# Patient Record
Sex: MALE | Race: WHITE | NOT HISPANIC OR LATINO | Employment: OTHER | URBAN - METROPOLITAN AREA
[De-identification: names, ages, dates, MRNs, and addresses within clinical notes are randomized per-mention and may not be internally consistent; named-entity substitution may affect disease eponyms.]

---

## 2023-03-08 DIAGNOSIS — N40.0 BENIGN PROSTATIC HYPERPLASIA, UNSPECIFIED WHETHER LOWER URINARY TRACT SYMPTOMS PRESENT: Primary | ICD-10-CM

## 2023-03-08 RX ORDER — TAMSULOSIN HYDROCHLORIDE 0.4 MG/1
0.4 CAPSULE ORAL DAILY
Qty: 90 CAPSULE | Refills: 3 | Status: SHIPPED | OUTPATIENT
Start: 2023-03-08 | End: 2023-06-27 | Stop reason: SDUPTHER

## 2023-06-16 LAB
ALANINE AMINOTRANSFERASE (SGPT) (U/L) IN SER/PLAS: 18 U/L (ref 10–52)
ALBUMIN (G/DL) IN SER/PLAS: 4 G/DL (ref 3.4–5)
ALKALINE PHOSPHATASE (U/L) IN SER/PLAS: 67 U/L (ref 33–136)
ANION GAP IN SER/PLAS: 10 MMOL/L (ref 10–20)
ASPARTATE AMINOTRANSFERASE (SGOT) (U/L) IN SER/PLAS: 21 U/L (ref 9–39)
BASOPHILS (10*3/UL) IN BLOOD BY AUTOMATED COUNT: 0.07 X10E9/L (ref 0–0.1)
BASOPHILS/100 LEUKOCYTES IN BLOOD BY AUTOMATED COUNT: 1.2 % (ref 0–2)
BILIRUBIN TOTAL (MG/DL) IN SER/PLAS: 0.8 MG/DL (ref 0–1.2)
CALCIUM (MG/DL) IN SER/PLAS: 9.3 MG/DL (ref 8.6–10.3)
CARBON DIOXIDE, TOTAL (MMOL/L) IN SER/PLAS: 28 MMOL/L (ref 21–32)
CHLORIDE (MMOL/L) IN SER/PLAS: 106 MMOL/L (ref 98–107)
CREATININE (MG/DL) IN SER/PLAS: 0.98 MG/DL (ref 0.5–1.3)
EOSINOPHILS (10*3/UL) IN BLOOD BY AUTOMATED COUNT: 0.41 X10E9/L (ref 0–0.4)
EOSINOPHILS/100 LEUKOCYTES IN BLOOD BY AUTOMATED COUNT: 7 % (ref 0–6)
ERYTHROCYTE DISTRIBUTION WIDTH (RATIO) BY AUTOMATED COUNT: 13.5 % (ref 11.5–14.5)
ERYTHROCYTE MEAN CORPUSCULAR HEMOGLOBIN CONCENTRATION (G/DL) BY AUTOMATED: 32.3 G/DL (ref 32–36)
ERYTHROCYTE MEAN CORPUSCULAR VOLUME (FL) BY AUTOMATED COUNT: 91 FL (ref 80–100)
ERYTHROCYTES (10*6/UL) IN BLOOD BY AUTOMATED COUNT: 4.95 X10E12/L (ref 4.5–5.9)
GFR MALE: 82 ML/MIN/1.73M2
GLUCOSE (MG/DL) IN SER/PLAS: 89 MG/DL (ref 74–99)
HEMATOCRIT (%) IN BLOOD BY AUTOMATED COUNT: 45.2 % (ref 41–52)
HEMOGLOBIN (G/DL) IN BLOOD: 14.6 G/DL (ref 13.5–17.5)
IMMATURE GRANULOCYTES/100 LEUKOCYTES IN BLOOD BY AUTOMATED COUNT: 0.2 % (ref 0–0.9)
LEUKOCYTES (10*3/UL) IN BLOOD BY AUTOMATED COUNT: 5.8 X10E9/L (ref 4.4–11.3)
LYMPHOCYTES (10*3/UL) IN BLOOD BY AUTOMATED COUNT: 1.67 X10E9/L (ref 0.8–3)
LYMPHOCYTES/100 LEUKOCYTES IN BLOOD BY AUTOMATED COUNT: 28.6 % (ref 13–44)
MONOCYTES (10*3/UL) IN BLOOD BY AUTOMATED COUNT: 0.52 X10E9/L (ref 0.05–0.8)
MONOCYTES/100 LEUKOCYTES IN BLOOD BY AUTOMATED COUNT: 8.9 % (ref 2–10)
NEUTROPHILS (10*3/UL) IN BLOOD BY AUTOMATED COUNT: 3.15 X10E9/L (ref 1.6–5.5)
NEUTROPHILS/100 LEUKOCYTES IN BLOOD BY AUTOMATED COUNT: 54.1 % (ref 40–80)
PLATELETS (10*3/UL) IN BLOOD AUTOMATED COUNT: 246 X10E9/L (ref 150–450)
POTASSIUM (MMOL/L) IN SER/PLAS: 3.8 MMOL/L (ref 3.5–5.3)
PROTEIN TOTAL: 7.2 G/DL (ref 6.4–8.2)
SODIUM (MMOL/L) IN SER/PLAS: 140 MMOL/L (ref 136–145)
UREA NITROGEN (MG/DL) IN SER/PLAS: 17 MG/DL (ref 6–23)

## 2023-06-27 ENCOUNTER — OFFICE VISIT (OUTPATIENT)
Dept: PRIMARY CARE | Facility: CLINIC | Age: 73
End: 2023-06-27
Payer: MEDICARE

## 2023-06-27 VITALS
OXYGEN SATURATION: 95 % | RESPIRATION RATE: 18 BRPM | SYSTOLIC BLOOD PRESSURE: 125 MMHG | HEIGHT: 66 IN | DIASTOLIC BLOOD PRESSURE: 90 MMHG | BODY MASS INDEX: 29.89 KG/M2 | TEMPERATURE: 97 F | WEIGHT: 186 LBS | HEART RATE: 95 BPM

## 2023-06-27 DIAGNOSIS — E66.3 OVERWEIGHT (BMI 25.0-29.9): ICD-10-CM

## 2023-06-27 DIAGNOSIS — N20.0 RECURRENT KIDNEY STONES: ICD-10-CM

## 2023-06-27 DIAGNOSIS — Z00.00 ROUTINE GENERAL MEDICAL EXAMINATION AT HEALTH CARE FACILITY: Primary | ICD-10-CM

## 2023-06-27 DIAGNOSIS — Z12.5 SCREENING FOR PROSTATE CANCER: ICD-10-CM

## 2023-06-27 DIAGNOSIS — K21.9 GASTROESOPHAGEAL REFLUX DISEASE, UNSPECIFIED WHETHER ESOPHAGITIS PRESENT: ICD-10-CM

## 2023-06-27 DIAGNOSIS — I10 PRIMARY HYPERTENSION: ICD-10-CM

## 2023-06-27 DIAGNOSIS — Z23 NEED FOR VACCINATION: ICD-10-CM

## 2023-06-27 DIAGNOSIS — N40.0 BENIGN PROSTATIC HYPERPLASIA, UNSPECIFIED WHETHER LOWER URINARY TRACT SYMPTOMS PRESENT: ICD-10-CM

## 2023-06-27 PROBLEM — K12.1 STOMATITIS: Status: ACTIVE | Noted: 2023-06-27

## 2023-06-27 PROBLEM — R19.5 HEME POSITIVE STOOL: Status: ACTIVE | Noted: 2023-06-27

## 2023-06-27 PROBLEM — E55.9 VITAMIN D DEFICIENCY: Status: ACTIVE | Noted: 2023-06-27

## 2023-06-27 PROBLEM — K27.9 PEPTIC ULCER: Status: ACTIVE | Noted: 2023-06-27

## 2023-06-27 PROBLEM — K20.90 ESOPHAGITIS: Status: ACTIVE | Noted: 2023-06-27

## 2023-06-27 PROBLEM — E66.9 OBESITY: Status: ACTIVE | Noted: 2023-06-27

## 2023-06-27 PROBLEM — R35.1 NOCTURIA: Status: ACTIVE | Noted: 2023-06-27

## 2023-06-27 PROCEDURE — 1159F MED LIST DOCD IN RCRD: CPT | Performed by: FAMILY MEDICINE

## 2023-06-27 PROCEDURE — 1170F FXNL STATUS ASSESSED: CPT | Performed by: FAMILY MEDICINE

## 2023-06-27 PROCEDURE — 90715 TDAP VACCINE 7 YRS/> IM: CPT | Performed by: FAMILY MEDICINE

## 2023-06-27 PROCEDURE — 90471 IMMUNIZATION ADMIN: CPT | Performed by: FAMILY MEDICINE

## 2023-06-27 PROCEDURE — 1036F TOBACCO NON-USER: CPT | Performed by: FAMILY MEDICINE

## 2023-06-27 PROCEDURE — G0439 PPPS, SUBSEQ VISIT: HCPCS | Performed by: FAMILY MEDICINE

## 2023-06-27 PROCEDURE — 99214 OFFICE O/P EST MOD 30 MIN: CPT | Performed by: FAMILY MEDICINE

## 2023-06-27 PROCEDURE — 3080F DIAST BP >= 90 MM HG: CPT | Performed by: FAMILY MEDICINE

## 2023-06-27 PROCEDURE — 1160F RVW MEDS BY RX/DR IN RCRD: CPT | Performed by: FAMILY MEDICINE

## 2023-06-27 PROCEDURE — 3074F SYST BP LT 130 MM HG: CPT | Performed by: FAMILY MEDICINE

## 2023-06-27 RX ORDER — LOSARTAN POTASSIUM AND HYDROCHLOROTHIAZIDE 12.5; 1 MG/1; MG/1
1 TABLET ORAL DAILY
COMMUNITY
End: 2023-06-27 | Stop reason: SDUPTHER

## 2023-06-27 RX ORDER — POTASSIUM CITRATE 10 MEQ/1
10 TABLET, EXTENDED RELEASE ORAL DAILY
Qty: 90 TABLET | Refills: 3 | Status: SHIPPED | OUTPATIENT
Start: 2023-06-27 | End: 2024-01-16 | Stop reason: SDUPTHER

## 2023-06-27 RX ORDER — LANSOPRAZOLE 30 MG/1
30 CAPSULE, DELAYED RELEASE ORAL DAILY
COMMUNITY
End: 2023-06-27 | Stop reason: SDUPTHER

## 2023-06-27 RX ORDER — LOSARTAN POTASSIUM AND HYDROCHLOROTHIAZIDE 12.5; 1 MG/1; MG/1
1 TABLET ORAL DAILY
Qty: 90 TABLET | Refills: 3 | Status: SHIPPED | OUTPATIENT
Start: 2023-06-27 | End: 2024-01-16 | Stop reason: SDUPTHER

## 2023-06-27 RX ORDER — LANSOPRAZOLE 30 MG/1
30 CAPSULE, DELAYED RELEASE ORAL DAILY
Qty: 90 CAPSULE | Refills: 3 | Status: SHIPPED | OUTPATIENT
Start: 2023-06-27 | End: 2024-01-16 | Stop reason: SDUPTHER

## 2023-06-27 RX ORDER — POTASSIUM CITRATE 10 MEQ/1
10 TABLET, EXTENDED RELEASE ORAL DAILY
COMMUNITY
End: 2023-06-27 | Stop reason: SDUPTHER

## 2023-06-27 RX ORDER — TAMSULOSIN HYDROCHLORIDE 0.4 MG/1
0.4 CAPSULE ORAL DAILY
Qty: 90 CAPSULE | Refills: 3 | Status: SHIPPED | OUTPATIENT
Start: 2023-06-27 | End: 2024-01-16 | Stop reason: SDUPTHER

## 2023-06-27 ASSESSMENT — PATIENT HEALTH QUESTIONNAIRE - PHQ9
SUM OF ALL RESPONSES TO PHQ9 QUESTIONS 1 AND 2: 0
2. FEELING DOWN, DEPRESSED OR HOPELESS: NOT AT ALL
1. LITTLE INTEREST OR PLEASURE IN DOING THINGS: NOT AT ALL

## 2023-06-27 ASSESSMENT — ACTIVITIES OF DAILY LIVING (ADL)
MANAGING_FINANCES: INDEPENDENT
TAKING_MEDICATION: INDEPENDENT
DRESSING: INDEPENDENT
BATHING: INDEPENDENT
DOING_HOUSEWORK: INDEPENDENT
GROCERY_SHOPPING: INDEPENDENT

## 2023-06-27 ASSESSMENT — ENCOUNTER SYMPTOMS
OCCASIONAL FEELINGS OF UNSTEADINESS: 0
LOSS OF SENSATION IN FEET: 0
DEPRESSION: 0

## 2023-06-27 NOTE — PATIENT INSTRUCTIONS
Please consider exercise program involving walking or some other form of aerobic activity 5 days weekly for 30 minutes... Let's also consider strengthening of large muscle groups like the abdominal muscles or shoulder muscles... Twice weekly with reps of 5/10/15 exercises and gradually increase strength.. This is not heavy strength training but light weight training... Sit ups or back exercise routine.. Please ask for handout if uncertain how to do..This  will help to strengthen your muscles which in turn will help you to lose weight.... You might ask what is the best diet available.. I would strongly encourage you to consider  Weight Watchers.. And as  your  fellow on  Weight Watchers physician attempting to  live this  LIFE  style  choice with you....  I will be glad to give you recommendations on what to eat.. Consider buying Britt bread.., byron bagle thin bread.. oikos yogurt... eggs  to eat as hard-boiled... Halo top ice cream for snack... All these are delicious foods which.. when eaten and  being compliant eating three  meals daily  breakfast lunch and dinner, drinking  64 ounces of water daily we will all win together !!!!!!!. This will be a means for you to lose weight... Consider also the smart phone cheli ... My plate.. Or My  fitness  pal..,  as additional possibilities for weight loss... Good  lucmahin Izaguirre!    Discussed medication side effects.  The  risk benefits and treatment options  discussed with patient.       Please schedule follow-up appointment based upon your improvement/failure to improve/chronic medical conditions and physician recommendations during office appointment at the .       Patient advised to go to er if symptoms worsen or to call answering service, or to return to office for additional evaluation    This note was partially  generated using Dragon voice recognition and there may be incorrect words, wording, spelling, or pronunciation errors that were not  corrected prior to committing the note to the medical record.     Problem List Items Addressed This Visit       BPH (benign prostatic hyperplasia)      PSA excellent repeat 12/23         Relevant Medications    tamsulosin (Flomax) 0.4 mg 24 hr capsule    GERD (gastroesophageal reflux disease)     Reflux appears controlled with current daily dosage of meds we will continue to monitor for side effects continue probiotics/yogurt to avoid C. difficile and monitor magnesium if having complaints of muscle cramps or pain take magnesium oxide 400 mg daily         Relevant Medications    lansoprazole (Prevacid) 30 mg DR capsule    Other Relevant Orders    CBC and Auto Differential    Hypertension     Stable with current medication regimen continue meds electrolytes look good we will repeat in 6 months         Relevant Medications    losartan-hydrochlorothiazide (Hyzaar) 100-12.5 mg tablet    Other Relevant Orders    Comprehensive Metabolic Panel    Overweight (BMI 25.0-29.9)     Weight is up try water as a treatment 30 ounces before breakfast and dinner please see wrap-up for discussion of diet         Recurrent kidney stones     Continue potassium citrate we will repeat labs in 6 months kidney function looks excellent increase water intake as discussed         Relevant Medications    potassium citrate CR (Urocit-K-10) 10 mEq ER tablet     Other Visit Diagnoses       Routine general medical examination at health care facility    -  Primary    Need for vaccination        Relevant Medications    zoster vaccine-recombinant adjuvanted (Shingrix) 50 mcg/0.5 mL vaccine    Screening for prostate cancer        Relevant Orders    Prostate Specific Antigen, Screen

## 2023-06-27 NOTE — ASSESSMENT & PLAN NOTE
Continue potassium citrate we will repeat labs in 6 months kidney function looks excellent increase water intake as discussed

## 2023-06-27 NOTE — ASSESSMENT & PLAN NOTE
Stable with current medication regimen continue meds electrolytes look good we will repeat in 6 months

## 2023-06-27 NOTE — ASSESSMENT & PLAN NOTE
Weight is up try water as a treatment 30 ounces before breakfast and dinner please see wrap-up for discussion of diet

## 2023-06-27 NOTE — ASSESSMENT & PLAN NOTE
Reflux appears controlled with current daily dosage of meds we will continue to monitor for side effects continue probiotics/yogurt to avoid C. difficile and monitor magnesium if having complaints of muscle cramps or pain take magnesium oxide 400 mg daily

## 2023-06-27 NOTE — PROGRESS NOTES
"Subjective   Patient ID: Jovan Walters is a 73 y.o. male who presents for Medicare Annual Wellness Visit Subsequent.    HPI   SKIN  ABRASION  RIGHT  KNEE  AND FELL  ON  CONCRETE  Patient is also here for follow-up of hypertension.. Symptoms do not include headache confusion visual disturbance dizziness shortness of breath chest pain syncope or palpitations. Recent blood pressure patient has   been checking.   125/85.. By report there is good compliance with treatment. Pertinent medical history includes bph  Bph 1-2  x    nocturia  nightly  .drinks water  vat  night   The reflux esophagitis he has been occurring in a recurrent pattern for years. The course has been without change. The reflux is described as mild to moderate. The patient remains compliant on meds.. The patient takes medications in a daily  .. And  daily  andwith intermittent  saw inflAATION  SO  ADVISEDTO  CONTINUE DAILYDenies dysphagia hoarseness or odynophagia... v  Review of Systems  cardiovascular:  no  palpitations or chest  pain  respiratory: no  shortness  of  breath  endocrine:  no polydipsia,  no polyuria  musculoskeletal:  no  myalgia.. no arthralgia  All other  systems discussed  negative   Objective   /90   Pulse 95   Temp 36.1 °C (97 °F)   Resp 18   Ht 1.676 m (5' 6\")   Wt 84.4 kg (186 lb)   SpO2 95%   BMI 30.02 kg/m²     Physical Exam  Vitals: I have reviewed the vitals  General: Well-developed.  In no acute distress.  Eyes:   sclera nonicteric.  Conjunctiva not injected.  No discharge.   HEAD: Normocephalic, atraumatic.  HEENT   Mucous membranes moist.  Posterior oropharynx nonerythematous, no tonsillar exudates.      No cervical lymphadenopathy.  Cardio: Regular rate and rhythm.  No murmur, rub or gallop.  Pulmonary: Lungs clear to auscultation in all fields.  No accessory muscle use.  GI/: Normal active bowel sounds.  Soft, nontender.  No masses or organomegaly appreciated.  Musculoskeletal: No gross deformities " appreciated.  Neuro: Alert, age-appropriate.  Normal muscle tone.  Moving all extremities.  Skin: No rash, bruises or lesions.   Review of lab testing shows no anemia white count 5.8 platelets 246 and excellent and CMP showed kidney function excellent liver tests within normal limits electrolytes within normal limits an  PSA 12/9/22..  1.99      Lipids dated 12/21 showed cholesterol 143 HDL 47 LDL 80 Numbers look excellent  Assessment/Plan   Problem List Items Addressed This Visit       BPH (benign prostatic hyperplasia)      PSA excellent repeat 12/23         Relevant Medications    tamsulosin (Flomax) 0.4 mg 24 hr capsule    GERD (gastroesophageal reflux disease)     Reflux appears controlled with current daily dosage of meds we will continue to monitor for side effects continue probiotics/yogurt to avoid C. difficile and monitor magnesium if having complaints of muscle cramps or pain take magnesium oxide 400 mg daily         Relevant Medications    lansoprazole (Prevacid) 30 mg DR capsule    Other Relevant Orders    CBC and Auto Differential    Hypertension     Stable with current medication regimen continue meds electrolytes look good we will repeat in 6 months         Relevant Medications    losartan-hydrochlorothiazide (Hyzaar) 100-12.5 mg tablet    Other Relevant Orders    Comprehensive Metabolic Panel    Overweight (BMI 25.0-29.9)     Weight is up try water as a treatment 30 ounces before breakfast and dinner please see wrap-up for discussion of diet         Recurrent kidney stones     Continue potassium citrate we will repeat labs in 6 months kidney function looks excellent increase water intake as discussed         Relevant Medications    potassium citrate CR (Urocit-K-10) 10 mEq ER tablet     Other Visit Diagnoses       Routine general medical examination at health care facility    -  Primary    Need for vaccination        Relevant Medications    zoster vaccine-recombinant adjuvanted (Shingrix) 50 mcg/0.5  mL vaccine    Screening for prostate cancer        Relevant Orders    Prostate Specific Antigen, Screen

## 2023-12-15 ENCOUNTER — LAB (OUTPATIENT)
Dept: LAB | Facility: LAB | Age: 73
End: 2023-12-15
Payer: MEDICARE

## 2023-12-15 DIAGNOSIS — K21.9 GASTROESOPHAGEAL REFLUX DISEASE, UNSPECIFIED WHETHER ESOPHAGITIS PRESENT: ICD-10-CM

## 2023-12-15 DIAGNOSIS — I10 PRIMARY HYPERTENSION: ICD-10-CM

## 2023-12-15 DIAGNOSIS — Z12.5 SCREENING FOR PROSTATE CANCER: ICD-10-CM

## 2023-12-15 LAB
ALBUMIN SERPL BCP-MCNC: 3.9 G/DL (ref 3.4–5)
ALP SERPL-CCNC: 61 U/L (ref 33–136)
ALT SERPL W P-5'-P-CCNC: 22 U/L (ref 10–52)
ANION GAP SERPL CALC-SCNC: 10 MMOL/L (ref 10–20)
AST SERPL W P-5'-P-CCNC: 24 U/L (ref 9–39)
BASOPHILS # BLD AUTO: 0.07 X10*3/UL (ref 0–0.1)
BASOPHILS NFR BLD AUTO: 1.4 %
BILIRUB SERPL-MCNC: 0.7 MG/DL (ref 0–1.2)
BUN SERPL-MCNC: 14 MG/DL (ref 6–23)
CALCIUM SERPL-MCNC: 9.1 MG/DL (ref 8.6–10.3)
CHLORIDE SERPL-SCNC: 107 MMOL/L (ref 98–107)
CO2 SERPL-SCNC: 29 MMOL/L (ref 21–32)
CREAT SERPL-MCNC: 0.99 MG/DL (ref 0.5–1.3)
EOSINOPHIL # BLD AUTO: 0.29 X10*3/UL (ref 0–0.4)
EOSINOPHIL NFR BLD AUTO: 5.9 %
ERYTHROCYTE [DISTWIDTH] IN BLOOD BY AUTOMATED COUNT: 13.4 % (ref 11.5–14.5)
GFR SERPL CREATININE-BSD FRML MDRD: 81 ML/MIN/1.73M*2
GLUCOSE SERPL-MCNC: 91 MG/DL (ref 74–99)
HCT VFR BLD AUTO: 42.5 % (ref 41–52)
HGB BLD-MCNC: 14.1 G/DL (ref 13.5–17.5)
IMM GRANULOCYTES # BLD AUTO: 0.01 X10*3/UL (ref 0–0.5)
IMM GRANULOCYTES NFR BLD AUTO: 0.2 % (ref 0–0.9)
LYMPHOCYTES # BLD AUTO: 1.42 X10*3/UL (ref 0.8–3)
LYMPHOCYTES NFR BLD AUTO: 28.7 %
MCH RBC QN AUTO: 30.3 PG (ref 26–34)
MCHC RBC AUTO-ENTMCNC: 33.2 G/DL (ref 32–36)
MCV RBC AUTO: 91 FL (ref 80–100)
MONOCYTES # BLD AUTO: 0.46 X10*3/UL (ref 0.05–0.8)
MONOCYTES NFR BLD AUTO: 9.3 %
NEUTROPHILS # BLD AUTO: 2.69 X10*3/UL (ref 1.6–5.5)
NEUTROPHILS NFR BLD AUTO: 54.5 %
NRBC BLD-RTO: 0 /100 WBCS (ref 0–0)
PLATELET # BLD AUTO: 240 X10*3/UL (ref 150–450)
POTASSIUM SERPL-SCNC: 4 MMOL/L (ref 3.5–5.3)
PROT SERPL-MCNC: 6.8 G/DL (ref 6.4–8.2)
PSA SERPL-MCNC: 2.04 NG/ML
RBC # BLD AUTO: 4.65 X10*6/UL (ref 4.5–5.9)
SODIUM SERPL-SCNC: 142 MMOL/L (ref 136–145)
WBC # BLD AUTO: 4.9 X10*3/UL (ref 4.4–11.3)

## 2023-12-15 PROCEDURE — 85025 COMPLETE CBC W/AUTO DIFF WBC: CPT

## 2023-12-15 PROCEDURE — 80053 COMPREHEN METABOLIC PANEL: CPT

## 2023-12-15 PROCEDURE — G0103 PSA SCREENING: HCPCS

## 2023-12-15 PROCEDURE — 36415 COLL VENOUS BLD VENIPUNCTURE: CPT

## 2023-12-26 ENCOUNTER — APPOINTMENT (OUTPATIENT)
Dept: PRIMARY CARE | Facility: CLINIC | Age: 73
End: 2023-12-26
Payer: MEDICARE

## 2024-01-16 ENCOUNTER — OFFICE VISIT (OUTPATIENT)
Dept: PRIMARY CARE | Facility: CLINIC | Age: 74
End: 2024-01-16
Payer: MEDICARE

## 2024-01-16 VITALS
BODY MASS INDEX: 29.73 KG/M2 | TEMPERATURE: 97.6 F | DIASTOLIC BLOOD PRESSURE: 74 MMHG | SYSTOLIC BLOOD PRESSURE: 111 MMHG | HEART RATE: 100 BPM | WEIGHT: 185 LBS | OXYGEN SATURATION: 96 % | HEIGHT: 66 IN | RESPIRATION RATE: 18 BRPM

## 2024-01-16 DIAGNOSIS — N20.0 RECURRENT KIDNEY STONES: ICD-10-CM

## 2024-01-16 DIAGNOSIS — E55.9 VITAMIN D DEFICIENCY: ICD-10-CM

## 2024-01-16 DIAGNOSIS — K21.9 GASTROESOPHAGEAL REFLUX DISEASE, UNSPECIFIED WHETHER ESOPHAGITIS PRESENT: ICD-10-CM

## 2024-01-16 DIAGNOSIS — I10 PRIMARY HYPERTENSION: ICD-10-CM

## 2024-01-16 DIAGNOSIS — C02.9 TONGUE CANCER (MULTI): ICD-10-CM

## 2024-01-16 DIAGNOSIS — N40.0 BENIGN PROSTATIC HYPERPLASIA, UNSPECIFIED WHETHER LOWER URINARY TRACT SYMPTOMS PRESENT: ICD-10-CM

## 2024-01-16 DIAGNOSIS — E66.3 OVERWEIGHT (BMI 25.0-29.9): Primary | ICD-10-CM

## 2024-01-16 PROCEDURE — 1160F RVW MEDS BY RX/DR IN RCRD: CPT | Performed by: FAMILY MEDICINE

## 2024-01-16 PROCEDURE — 99214 OFFICE O/P EST MOD 30 MIN: CPT | Performed by: FAMILY MEDICINE

## 2024-01-16 PROCEDURE — 3074F SYST BP LT 130 MM HG: CPT | Performed by: FAMILY MEDICINE

## 2024-01-16 PROCEDURE — 3078F DIAST BP <80 MM HG: CPT | Performed by: FAMILY MEDICINE

## 2024-01-16 PROCEDURE — 1159F MED LIST DOCD IN RCRD: CPT | Performed by: FAMILY MEDICINE

## 2024-01-16 PROCEDURE — 1036F TOBACCO NON-USER: CPT | Performed by: FAMILY MEDICINE

## 2024-01-16 RX ORDER — POTASSIUM CITRATE 10 MEQ/1
10 TABLET, EXTENDED RELEASE ORAL DAILY
Qty: 90 TABLET | Refills: 3 | Status: SHIPPED | OUTPATIENT
Start: 2024-01-16

## 2024-01-16 RX ORDER — LANSOPRAZOLE 30 MG/1
30 CAPSULE, DELAYED RELEASE ORAL DAILY
Qty: 90 CAPSULE | Refills: 3 | Status: SHIPPED | OUTPATIENT
Start: 2024-01-16

## 2024-01-16 RX ORDER — LOSARTAN POTASSIUM AND HYDROCHLOROTHIAZIDE 12.5; 1 MG/1; MG/1
1 TABLET ORAL DAILY
Qty: 90 TABLET | Refills: 3 | Status: SHIPPED | OUTPATIENT
Start: 2024-01-16

## 2024-01-16 RX ORDER — TAMSULOSIN HYDROCHLORIDE 0.4 MG/1
0.4 CAPSULE ORAL DAILY
Qty: 90 CAPSULE | Refills: 3 | Status: SHIPPED | OUTPATIENT
Start: 2024-01-16 | End: 2025-01-15

## 2024-01-16 ASSESSMENT — ENCOUNTER SYMPTOMS
FREQUENCY: 0
VOICE CHANGE: 0
ARTHRALGIAS: 0
CONSTITUTIONAL NEGATIVE: 1
DIFFICULTY URINATING: 0
NUMBNESS: 0
NECK PAIN: 0
JOINT SWELLING: 0
NEUROLOGICAL NEGATIVE: 1
ADENOPATHY: 0
VOMITING: 0
NAUSEA: 0
EYES NEGATIVE: 1
SHORTNESS OF BREATH: 0
ENDOCRINE NEGATIVE: 1
COLOR CHANGE: 0
TROUBLE SWALLOWING: 0
SPEECH DIFFICULTY: 0
DIARRHEA: 0
PSYCHIATRIC NEGATIVE: 1
WEAKNESS: 0
HEMATOLOGIC/LYMPHATIC NEGATIVE: 1
GASTROINTESTINAL NEGATIVE: 1
ABDOMINAL PAIN: 0
POLYDIPSIA: 0
DIZZINESS: 0
MUSCULOSKELETAL NEGATIVE: 1
RESPIRATORY NEGATIVE: 1
HYPERTENSION: 1
COUGH: 0
ALLERGIC/IMMUNOLOGIC NEGATIVE: 1
FACIAL SWELLING: 0

## 2024-01-16 NOTE — ASSESSMENT & PLAN NOTE
Patient is also here for follow-up of hypertension.. Symptoms do not include headache confusion visual disturbance dizziness shortness of breath chest pain syncope or palpitations. Recent blood pressure patient has not been checking. By report there is good compliance with treatment. Pertinent medical history includes reflux   stable and continue meds

## 2024-01-16 NOTE — ASSESSMENT & PLAN NOTE
The reflux esophagitis he has been occurring in a recurrent pattern for years. The course has been without change. The reflux is described as mild to moderate. The patient remains compliant on meds.. The patient takes medications in a daily fashion. Denies dysphagia hoarseness or odynophagia...   stable and continue meds

## 2024-01-16 NOTE — PROGRESS NOTES
"Subjective   Patient ID: Jovan Walters is a 73 y.o. male who presents for Follow-up.    GERD  This is a recurrent problem. The current episode started more than 1 month ago. The problem occurs rarely. The problem has been resolved. The treatment provided significant relief.   Hypertension  The problem has been resolved since onset. The problem is controlled. The current treatment provides significant improvement.        Review of Systems    Objective   /74   Pulse 100   Temp 36.4 °C (97.6 °F)   Resp 18   Ht 1.676 m (5' 6\")   Wt 83.9 kg (185 lb)   SpO2 96%   BMI 29.86 kg/m²     Physical Exam    Assessment/Plan   Problem List Items Addressed This Visit    None    "

## 2024-01-16 NOTE — PATIENT INSTRUCTIONS

## 2024-01-16 NOTE — PROGRESS NOTES
"Subjective   Patient ID: Jovan Walters is a 73 y.o. male who presents for Follow-up.    HPI    The reflux esophagitis he has been occurring in a recurrent pattern for years. The course has been without change. The reflux is described as mild to moderate. The patient remains compliant on meds.. The patient takes medications in a daily fashion. Denies dysphagia hoarseness or odynophagia...   Kidney stones stable   Patient is also here for follow-up of hypertension.. Symptoms do not include headache confusion visual disturbance dizziness shortness of breath chest pain syncope or palpitations. Recent blood pressure patient has not been checking. By report there is good compliance with treatment. Pertinent medical history includes  /hyperlipidemia   Review of Systems   Constitutional: Negative.    HENT:  Positive for congestion. Negative for ear pain, facial swelling, trouble swallowing and voice change.    Eyes: Negative.    Respiratory: Negative.  Negative for cough and shortness of breath.    Cardiovascular:  Negative for chest pain and leg swelling.   Gastrointestinal: Negative.  Negative for abdominal pain, diarrhea, nausea and vomiting.   Endocrine: Negative.  Negative for cold intolerance, heat intolerance, polydipsia and polyuria.   Genitourinary: Negative.  Negative for difficulty urinating, frequency and urgency.   Musculoskeletal: Negative.  Negative for arthralgias, joint swelling and neck pain.   Skin: Negative.  Negative for color change and rash.   Allergic/Immunologic: Negative.    Neurological: Negative.  Negative for dizziness, speech difficulty, weakness and numbness.   Hematological: Negative.  Negative for adenopathy.   Psychiatric/Behavioral: Negative.         Objective   /74   Pulse 100   Temp 36.4 °C (97.6 °F)   Resp 18   Ht 1.676 m (5' 6\")   Wt 83.9 kg (185 lb)   SpO2 96%   BMI 29.86 kg/m²     Physical Exam  Constitutional:       Appearance: Normal appearance.   HENT:      Right Ear: " Tympanic membrane normal.      Left Ear: Tympanic membrane normal.      Nose: Congestion present.   Cardiovascular:      Rate and Rhythm: Normal rate and regular rhythm.      Pulses: Normal pulses.      Heart sounds: Normal heart sounds.   Pulmonary:      Effort: No respiratory distress.      Breath sounds: Normal breath sounds. No wheezing.   Abdominal:      General: Bowel sounds are normal. There is no distension.      Palpations: Abdomen is soft. There is no mass.      Tenderness: There is no abdominal tenderness.   Musculoskeletal:         General: No swelling or deformity.      Cervical back: Normal range of motion. No tenderness.   Neurological:      Mental Status: He is alert. Mental status is at baseline.      Motor: No weakness.   Psychiatric:         Mood and Affect: Mood normal.     Comprehensive Metabolic Panel  Order: [id number]  Status: Final result       Visible to patient: Yes (seen)       Dx: Primary hypertension    2 Result Notes            Component  Ref Range & Units 1 mo ago  (12/15/23) 7 mo ago  (6/16/23) 1 yr ago  (12/9/22) 2 yr ago  (12/22/21) 2 yr ago  (6/30/21) 3 yr ago  (1/2/21) 3 yr ago  (6/10/20)   Glucose  74 - 99 mg/dL 91 89 92 90 97 104 High  93   Sodium  136 - 145 mmol/L 142 140 144 142 139 142 145   Potassium  3.5 - 5.3 mmol/L 4.0 3.8 3.9 3.8 3.9 3.8 4.3   Chloride  98 - 107 mmol/L 107 106 107 103 104 105 107   Bicarbonate  21 - 32 mmol/L 29 28 30 33 High  28 29 32   Anion Gap  10 - 20 mmol/L 10 10 11 10 11 12 10   Urea Nitrogen  6 - 23 mg/dL 14 17 15 14 15 13 16   Creatinine  0.50 - 1.30 mg/dL 0.99 0.98 1.01 1.13 1.06 0.96 1.00   eGFR  >60 mL/min/1.73m*2 81         Comment: Calculations of estimated GFR are performed using the 2021 CKD-EPI Study Refit equation without the race variable for the IDMS-Traceable creatinine methods.  https://jasn.asnjournals.org/content/early/2021/09/22/ASN.5836868958   Calcium  8.6 - 10.3 mg/dL 9.1 9.3 9.3 9.4 9.2 9.5 9.9 R   Albumin  3.4 - 5.0 g/dL 3.9  4.0 3.9 4.1 4.1 4.2 4.2   Alkaline Phosphatase  33 - 136 U/L 61 67 68 77 72 65 62   Total Protein  6.4 - 8.2 g/dL 6.8 7.2 7.2 7.3 7.5 7.6 7.1   AST  9 - 39 U/L 24 21 19 24 22 22 22   Bilirubin, Total  0.0 - 1.2 mg/dL 0.7 0.8 0.8 0.5 0.7 0.8 0.6   ALT  10 - 52 U/L 22 18 CM 14 CM 28 CM 18 CM 17 CM 20 CM   Comment: Patients treated with Sulfasalazine may generate falsely decreased results for ALT.   Resulting Agency [facility]                 Prostate Specific Antigen, Screen  Order: [id number]  Status: Final result       Visible to patient: Yes (seen)       Dx: Screening for prostate cancer    2 Result Notes         Component  Ref Range & Units 1 mo ago 1 yr ago 2 yr ago 4 yr ago   Prostate Specific Antigen,Screen  <=4.00 ng/mL 2.04 1.99 R, CM 1.90 R, CM 1.75 R, CM   Resulting Agency [facility]              Narrative  Performed by: [facility]  The FDA requires that the method used for PSA assay be reported to the physician. Values obtained with different assay methods must not be used interchangeably. This test was performed at [facility] using the Access Hybritech PSA assay is a two-site immunoenzymatic sandwich assay. The assay is approved for measurement of prostate-specific antigen (PSA)in serum and may be used in conjunction with a digital rectal examination in men 50 years and older as an aid in detection of prostate cancer. 5-Alpha-reductase inhibitors (e.g. Proscar, Finasteride, Avodart, Dutasteride and Becki) for the treatment of BPH have been shown to lower PSA levels by an average of 50% after 6 months of treatment.      Specimen Collected: 12/15/23 08:41 Last Resulted: 12/15/23 13:59         CBC and Auto Differential  Order: [id number]  Status: Final result       Visible to patient: Yes (seen)       Dx: Gastroesophageal reflux disease, unsp...    2  Result Notes            Component  Ref Range & Units 1 mo ago  (12/15/23) 7 mo ago  (6/16/23) 1 yr ago  (12/9/22) 2 yr ago  (12/22/21) 2 yr ago  (6/30/21) 3 yr ago  (1/2/21) 3 yr ago  (6/10/20)   WBC  4.4 - 11.3 x10*3/uL 4.9 5.8 R 5.5 R 6.0 R 6.3 R 6.7 R 5.5 R   nRBC  0.0 - 0.0 /100 WBCs 0.0      0.0 R   RBC  4.50 - 5.90 x10*6/uL 4.65 4.95 R 4.84 R 5.13 R 4.88 R 5.01 R 4.62 R   Hemoglobin  13.5 - 17.5 g/dL 14.1 14.6 14.7 15.4 14.6 14.9 14.3   Hematocrit  41.0 - 52.0 % 42.5 45.2 43.9 47.8 45.1 46.6 43.4   MCV  80 - 100 fL 91 91 91 93 92 93 94   MCH  26.0 - 34.0 pg 30.3         MCHC  32.0 - 36.0 g/dL 33.2 32.3 33.5 32.2 32.4 32.0 32.9   RDW  11.5 - 14.5 % 13.4 13.5 13.2 13.1 13.1 13.3 13.8   Platelets  150 - 450 x10*3/uL 240 246 R 234 R 258 R 217 R 244 R 267 R   Neutrophils %  40.0 - 80.0 % 54.5 54.1 57.5 59.5 67.7 58.1 63.8   Immature Granulocytes %, Automated  0.0 - 0.9 % 0.2 0.2 CM 0.2 CM 0.2 CM 0.2 CM 0.3 CM 0.2 CM   Comment: Immature Granulocyte Count (IG) includes promyelocytes, myelocytes and metamyelocytes but does not include bands. Percent differential counts (%) should be interpreted in the context of the absolute cell counts (cells/UL).   Lymphocytes %  13.0 - 44.0 % 28.7 28.6 26.3 22.1 12.9 25.6 22.0   Monocytes %  2.0 - 10.0 % 9.3 8.9 9.3 9.5 14.2 8.7 8.3   Eosinophils %  0.0 - 6.0 % 5.9 7.0 5.6 7.5 4.0 6.2 4.6   Basophils %  0.0 - 2.0 % 1.4 1.2 1.1 1.2 1.0 1.1 1.1   Neutrophils Absolute  1.60 - 5.50 x10*3/uL 2.69 3.15 R 3.17 R 3.59 R 4.24 R 3.87 R 3.48 R   Comment: Percent differential counts (%) should be interpreted in the context of the absolute cell counts (cells/uL).   Immature Granulocytes Absolute, Automated  0.00 - 0.50 x10*3/uL 0.01         Lymphocytes Absolute  0.80 - 3.00 x10*3/uL 1.42 1.67 R 1.45 R 1.33 R 0.81 Low  R 1.70 R 1.20 R   Monocytes Absolute  0.05 - 0.80 x10*3/uL 0.46 0.52 R 0.51 R 0.57 R 0.89 R 0.58 R 0.45 R   Eosinophils Absolute  0.00 - 0.40 x10*3/uL 0.29 0.41 High  R 0.31 R  0.45 R 0.25 R 0.41 R 0.25 R   Basophils Absolute  0.00 - 0.10 x10*3/uL 0.07 0.07 R 0.06 R 0.07 R 0.06 R 0.07 R 0.06 R   Resulting Agency [facility]              Specimen Collected: 12/15/23 08:41 Last Resulted: 12/15/23 12:40             Lipid Panel  Order: [id number]  Status: Final result       Visible to patient: Yes (not seen)    0 Result Notes       1  Topic         Component  Ref Range & Units 2 yr ago 3 yr ago 4 yr ago 5 yr ago   Cholesterol  0 - 199 mg/dL 143 153   CM   Comment: .      AGE      DESIRABLE   BORDERLINE HIGH   HIGH     0-19 Y     0 - 169       170 - 199     >/= 200    20-24 Y     0 - 189       190 - 224     >/= 225        >24 Y     0 - 199       200 - 239     >/= 240   **All ranges are based on fasting samples. Specific   therapeutic targets will vary based on patient-specific   cardiac risk.  .   Pediatric guidelines reference:Pediatrics 2011, 128(S5).   Adult guidelines reference: NCEP ATPIII Guidelines,    FLIP 2001, 258:2486-97  .   Venipuncture immediately after or during the   administration of Metamizole may lead to falsely   low results. Testing should be performed immediately   prior to Metamizole dosing.   HDL  mg/dL 47.0 53.0 CM 50.4 CM 50.6 CM   Comment: .      AGE      VERY LOW   LOW     NORMAL    HIGH       0-19 Y       < 35   < 40     40-45     ----    20-24 Y       ----   < 40       >45     ----      >24 Y       ----   < 40     40-60      >60  .   Cholesterol/HDL Ratio 3.0 2.9 CM 2.9 CM 2.8 CM   Comment: REF VALUES  DESIRABLE  < 3.4  HIGH RISK  > 5.0   LDL  0 - 99 mg/dL 80 85 CM 79 CM 73 CM   Comment: .                           NEAR      BORD      AGE      DESIRABLE  OPTIMAL    HIGH     HIGH     VERY HIGH     0-19 Y     0 - 109     ---    110-129   >/= 130     ----    20-24 Y     0 - 119     ---    120-159   >/= 160     ----      >24 Y     0 -  99   100-129   130-159   160-189     >/=190  .   VLDL  0 - 40 mg/dL 16 15 14 19   Triglycerides  0 - 149 mg/dL 78 77 CM 72 CM 97 CM   Comment: .      AGE      DESIRABLE   BORDERLINE HIGH   HIGH     VERY HIGH   0 D-90 D    19 - 174         ----         ----        ----  91 D- 9 Y     0 -  74        75 -  99     >/= 100      ----    10-19 Y     0 -  89        90 - 129     >/= 130      ----    20-24 Y     0 - 114       115 - 149     >/= 150      ----        >24 Y     0 - 149       150 - 199    200- 499    >/= 500  .   Venipuncture immediately after or during the   administration of Metamizole may lead to falsely   low results. Testing should be performed immediately   prior to Metamizole dosing.   Resulting Agency [facility]              Specimen Collected: 12/22/21 09:33 Last Resulted: 12/22/21 13:49                 Assessment/Plan   Problem List Items Addressed This Visit             ICD-10-CM    BPH (benign prostatic hyperplasia) N40.0    Relevant Medications    tamsulosin (Flomax) 0.4 mg 24 hr capsule    GERD (gastroesophageal reflux disease) K21.9      The reflux esophagitis he has been occurring in a recurrent pattern for years. The course has been without change. The reflux is described as mild to moderate. The patient remains compliant on meds.. The patient takes medications in a daily fashion. Denies dysphagia hoarseness or odynophagia...   stable and continue meds             Relevant Medications    lansoprazole (Prevacid) 30 mg DR capsule    Other Relevant Orders    Vitamin B12    CBC and Auto Differential    Hypertension I10        Patient is also here for follow-up of hypertension.. Symptoms do not include headache confusion visual disturbance dizziness shortness of breath chest pain syncope or palpitations. Recent blood pressure patient has not been checking. By report there is good compliance with treatment. Pertinent medical history includes reflux   stable and continue  meds          Relevant Medications    losartan-hydrochlorothiazide (Hyzaar) 100-12.5 mg tablet    Other Relevant Orders    Lipid Panel    Overweight (BMI 25.0-29.9) - Primary E66.3     See wqrap up          Recurrent kidney stones N20.0     Kidney stones stable continue  k citrate          Relevant Medications    potassium citrate CR (Urocit-K-10) 10 mEq ER tablet    Other Relevant Orders    Comprehensive Metabolic Panel    Vitamin D deficiency E55.9    Relevant Orders    Vitamin D 25-Hydroxy,Total (for eval of Vitamin D levels)    Tongue cancer (CMS/HCC) 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

## 2024-07-12 ENCOUNTER — LAB (OUTPATIENT)
Dept: LAB | Facility: LAB | Age: 74
End: 2024-07-12
Payer: MEDICARE

## 2024-07-12 DIAGNOSIS — N20.0 RECURRENT KIDNEY STONES: ICD-10-CM

## 2024-07-12 DIAGNOSIS — I10 PRIMARY HYPERTENSION: ICD-10-CM

## 2024-07-12 DIAGNOSIS — K21.9 GASTROESOPHAGEAL REFLUX DISEASE, UNSPECIFIED WHETHER ESOPHAGITIS PRESENT: ICD-10-CM

## 2024-07-12 DIAGNOSIS — E55.9 VITAMIN D DEFICIENCY: ICD-10-CM

## 2024-07-12 LAB
25(OH)D3 SERPL-MCNC: 66 NG/ML (ref 30–100)
ALBUMIN SERPL BCP-MCNC: 3.9 G/DL (ref 3.4–5)
ALP SERPL-CCNC: 69 U/L (ref 33–136)
ALT SERPL W P-5'-P-CCNC: 15 U/L (ref 10–52)
ANION GAP SERPL CALC-SCNC: 9 MMOL/L (ref 10–20)
AST SERPL W P-5'-P-CCNC: 17 U/L (ref 9–39)
BASOPHILS # BLD AUTO: 0.06 X10*3/UL (ref 0–0.1)
BASOPHILS NFR BLD AUTO: 1.1 %
BILIRUB SERPL-MCNC: 0.6 MG/DL (ref 0–1.2)
BUN SERPL-MCNC: 15 MG/DL (ref 6–23)
CALCIUM SERPL-MCNC: 9.3 MG/DL (ref 8.6–10.3)
CHLORIDE SERPL-SCNC: 107 MMOL/L (ref 98–107)
CHOLEST SERPL-MCNC: 145 MG/DL (ref 0–199)
CHOLESTEROL/HDL RATIO: 2.8
CO2 SERPL-SCNC: 29 MMOL/L (ref 21–32)
CREAT SERPL-MCNC: 0.93 MG/DL (ref 0.5–1.3)
EGFRCR SERPLBLD CKD-EPI 2021: 87 ML/MIN/1.73M*2
EOSINOPHIL # BLD AUTO: 0.38 X10*3/UL (ref 0–0.4)
EOSINOPHIL NFR BLD AUTO: 6.9 %
ERYTHROCYTE [DISTWIDTH] IN BLOOD BY AUTOMATED COUNT: 13.7 % (ref 11.5–14.5)
GLUCOSE SERPL-MCNC: 91 MG/DL (ref 74–99)
HCT VFR BLD AUTO: 44 % (ref 41–52)
HDLC SERPL-MCNC: 52.2 MG/DL
HGB BLD-MCNC: 14.2 G/DL (ref 13.5–17.5)
IMM GRANULOCYTES # BLD AUTO: 0.01 X10*3/UL (ref 0–0.5)
IMM GRANULOCYTES NFR BLD AUTO: 0.2 % (ref 0–0.9)
LDLC SERPL CALC-MCNC: 78 MG/DL
LYMPHOCYTES # BLD AUTO: 1.41 X10*3/UL (ref 0.8–3)
LYMPHOCYTES NFR BLD AUTO: 25.7 %
MCH RBC QN AUTO: 29.8 PG (ref 26–34)
MCHC RBC AUTO-ENTMCNC: 32.3 G/DL (ref 32–36)
MCV RBC AUTO: 92 FL (ref 80–100)
MONOCYTES # BLD AUTO: 0.47 X10*3/UL (ref 0.05–0.8)
MONOCYTES NFR BLD AUTO: 8.6 %
NEUTROPHILS # BLD AUTO: 3.16 X10*3/UL (ref 1.6–5.5)
NEUTROPHILS NFR BLD AUTO: 57.5 %
NON HDL CHOLESTEROL: 93 MG/DL (ref 0–149)
NRBC BLD-RTO: 0 /100 WBCS (ref 0–0)
PLATELET # BLD AUTO: 221 X10*3/UL (ref 150–450)
POTASSIUM SERPL-SCNC: 4.3 MMOL/L (ref 3.5–5.3)
PROT SERPL-MCNC: 6.8 G/DL (ref 6.4–8.2)
RBC # BLD AUTO: 4.77 X10*6/UL (ref 4.5–5.9)
SODIUM SERPL-SCNC: 141 MMOL/L (ref 136–145)
TRIGL SERPL-MCNC: 72 MG/DL (ref 0–149)
VIT B12 SERPL-MCNC: 810 PG/ML (ref 211–911)
VLDL: 14 MG/DL (ref 0–40)
WBC # BLD AUTO: 5.5 X10*3/UL (ref 4.4–11.3)

## 2024-07-12 PROCEDURE — 82607 VITAMIN B-12: CPT

## 2024-07-12 PROCEDURE — 80053 COMPREHEN METABOLIC PANEL: CPT

## 2024-07-12 PROCEDURE — 80061 LIPID PANEL: CPT

## 2024-07-12 PROCEDURE — 36415 COLL VENOUS BLD VENIPUNCTURE: CPT

## 2024-07-12 PROCEDURE — 85025 COMPLETE CBC W/AUTO DIFF WBC: CPT

## 2024-07-12 PROCEDURE — 82306 VITAMIN D 25 HYDROXY: CPT

## 2024-07-14 DIAGNOSIS — K21.9 GASTROESOPHAGEAL REFLUX DISEASE, UNSPECIFIED WHETHER ESOPHAGITIS PRESENT: ICD-10-CM

## 2024-07-15 RX ORDER — LANSOPRAZOLE 30 MG/1
30 CAPSULE, DELAYED RELEASE ORAL DAILY
Qty: 90 CAPSULE | Refills: 3 | Status: SHIPPED | OUTPATIENT
Start: 2024-07-15 | End: 2024-07-19 | Stop reason: SDUPTHER

## 2024-07-19 ENCOUNTER — APPOINTMENT (OUTPATIENT)
Dept: PRIMARY CARE | Facility: CLINIC | Age: 74
End: 2024-07-19
Payer: MEDICARE

## 2024-07-19 VITALS
SYSTOLIC BLOOD PRESSURE: 100 MMHG | HEART RATE: 95 BPM | WEIGHT: 184 LBS | TEMPERATURE: 97 F | DIASTOLIC BLOOD PRESSURE: 72 MMHG | RESPIRATION RATE: 18 BRPM | BODY MASS INDEX: 29.57 KG/M2 | OXYGEN SATURATION: 96 % | HEIGHT: 66 IN

## 2024-07-19 DIAGNOSIS — R73.9 HYPERGLYCEMIA, UNSPECIFIED: ICD-10-CM

## 2024-07-19 DIAGNOSIS — N20.0 RECURRENT KIDNEY STONES: ICD-10-CM

## 2024-07-19 DIAGNOSIS — E55.9 VITAMIN D DEFICIENCY: ICD-10-CM

## 2024-07-19 DIAGNOSIS — N40.0 BENIGN PROSTATIC HYPERPLASIA, UNSPECIFIED WHETHER LOWER URINARY TRACT SYMPTOMS PRESENT: ICD-10-CM

## 2024-07-19 DIAGNOSIS — E66.3 OVERWEIGHT (BMI 25.0-29.9): ICD-10-CM

## 2024-07-19 DIAGNOSIS — Z12.5 SCREENING FOR PROSTATE CANCER: ICD-10-CM

## 2024-07-19 DIAGNOSIS — Z00.00 ROUTINE GENERAL MEDICAL EXAMINATION AT HEALTH CARE FACILITY: Primary | ICD-10-CM

## 2024-07-19 DIAGNOSIS — I10 PRIMARY HYPERTENSION: ICD-10-CM

## 2024-07-19 DIAGNOSIS — K21.9 GASTROESOPHAGEAL REFLUX DISEASE, UNSPECIFIED WHETHER ESOPHAGITIS PRESENT: ICD-10-CM

## 2024-07-19 DIAGNOSIS — Z23 NEED FOR VACCINATION: ICD-10-CM

## 2024-07-19 DIAGNOSIS — Z13.1 DIABETES MELLITUS SCREENING: ICD-10-CM

## 2024-07-19 LAB — POC HEMOGLOBIN A1C: 5.4 % (ref 4.2–6.5)

## 2024-07-19 RX ORDER — TAMSULOSIN HYDROCHLORIDE 0.4 MG/1
0.4 CAPSULE ORAL DAILY
Qty: 90 CAPSULE | Refills: 3 | Status: SHIPPED | OUTPATIENT
Start: 2024-07-19 | End: 2025-07-19

## 2024-07-19 RX ORDER — LANSOPRAZOLE 30 MG/1
30 CAPSULE, DELAYED RELEASE ORAL DAILY
Qty: 90 CAPSULE | Refills: 3 | Status: SHIPPED | OUTPATIENT
Start: 2024-07-19

## 2024-07-19 RX ORDER — LOSARTAN POTASSIUM AND HYDROCHLOROTHIAZIDE 12.5; 1 MG/1; MG/1
1 TABLET ORAL DAILY
Qty: 90 TABLET | Refills: 3 | Status: SHIPPED | OUTPATIENT
Start: 2024-07-19

## 2024-07-19 RX ORDER — POTASSIUM CITRATE 10 MEQ/1
10 TABLET, EXTENDED RELEASE ORAL DAILY
Qty: 90 TABLET | Refills: 3 | Status: SHIPPED | OUTPATIENT
Start: 2024-07-19

## 2024-07-19 ASSESSMENT — ACTIVITIES OF DAILY LIVING (ADL)
DRESSING: INDEPENDENT
DOING_HOUSEWORK: INDEPENDENT
GROCERY_SHOPPING: INDEPENDENT
BATHING: INDEPENDENT
MANAGING_FINANCES: INDEPENDENT
TAKING_MEDICATION: INDEPENDENT

## 2024-07-19 ASSESSMENT — ENCOUNTER SYMPTOMS
DEPRESSION: 0
LOSS OF SENSATION IN FEET: 0
OCCASIONAL FEELINGS OF UNSTEADINESS: 0

## 2024-07-19 ASSESSMENT — PATIENT HEALTH QUESTIONNAIRE - PHQ9
2. FEELING DOWN, DEPRESSED OR HOPELESS: NOT AT ALL
SUM OF ALL RESPONSES TO PHQ9 QUESTIONS 1 AND 2: 0
1. LITTLE INTEREST OR PLEASURE IN DOING THINGS: NOT AT ALL

## 2024-07-19 NOTE — ASSESSMENT & PLAN NOTE
The reflux esophagitis he has been occurring in a recurrent pattern for years. The course has been without change. The reflux is described as mild  The patient remains compliant on meds.. The patient takes medications in a daily  fashion. Denies dysphagia hoarseness or odynophagia...

## 2024-07-19 NOTE — PROGRESS NOTES
Subjective   Patient ID: Jovan Walters is a 74 y.o. male who presents for Medicare Annual Wellness Visit Subsequent.    HPI    Patient is also here for follow-up of hypertension.. Symptoms do not include headache confusion visual disturbance dizziness shortness of breath chest pain syncope or palpitations. Recent blood pressure patient has been checking. 120/80By report there is good compliance with treatment. Pertinent medical history includes  hyperlipidemia .   The reflux esophagitis he has been occurring in a recurrent pattern for years. The course has been without change. The reflux is described as mild  The patient remains compliant on meds.. The patient takes medications in a daily  fashion. Denies dysphagia hoarseness or odynophagia...   Current Outpatient Medications on File Prior to Visit   Medication Sig Dispense Refill    [DISCONTINUED] lansoprazole (Prevacid) 30 mg DR capsule Take 1 capsule (30 mg) by mouth once daily. 90 capsule 3    [DISCONTINUED] lansoprazole (Prevacid) 30 mg DR capsule TAKE 1 CAPSULE BY MOUTH once daily. 90 capsule 3    [DISCONTINUED] losartan-hydrochlorothiazide (Hyzaar) 100-12.5 mg tablet Take 1 tablet by mouth once daily. 90 tablet 3    [DISCONTINUED] potassium citrate CR (Urocit-K-10) 10 mEq ER tablet Take 1 tablet (10 mEq) by mouth once daily. 90 tablet 3    [DISCONTINUED] tamsulosin (Flomax) 0.4 mg 24 hr capsule Take 1 capsule (0.4 mg) by mouth once daily. 90 capsule 3     No current facility-administered medications on file prior to visit.      No Known Allergies      Social History     Socioeconomic History    Marital status:      Spouse name: Not on file    Number of children: Not on file    Years of education: Not on file    Highest education level: Not on file   Occupational History    Not on file   Tobacco Use    Smoking status: Never    Smokeless tobacco: Never   Vaping Use    Vaping status: Never Used   Substance and Sexual Activity    Alcohol use: Never    Drug  "use: Never    Sexual activity: Yes     Partners: Female     Birth control/protection: Male Sterilization   Other Topics Concern    Not on file   Social History Narrative    Not on file     Social Determinants of Health     Financial Resource Strain: Not on file   Food Insecurity: Not on file   Transportation Needs: Not on file   Physical Activity: Not on file   Stress: Not on file   Social Connections: Not on file   Intimate Partner Violence: Not on file   Housing Stability: Not on file      Family History   Problem Relation Name Age of Onset    Asthma Mother Sho     Tuberculosis Mother Sho     Heart disease Mother Sho     Breast cancer Mother Sho     Arthritis Mother Sho     Cancer Mother Sho     Stroke Mother Sho     Other (brain tumor) Father Tiburcio     Cancer Father Tiburcio       Review of Systems  All other  pertinent  systems reviewed and are negative except  those  mentioned  in HPI   Objective   /72   Pulse 95   Temp 36.1 °C (97 °F)   Resp 18   Ht 1.676 m (5' 6\")   Wt 83.5 kg (184 lb)   SpO2 96%   BMI 29.70 kg/m²     Physical Exam  Vitals: I have reviewed the vitals  General: Well-developed.  In no acute distress.  Eyes:   sclera nonicteric.  Conjunctiva not injected.  No discharge.   HEAD: Normocephalic, atraumatic.  HEENT   Mucous membranes moist.  Posterior oropharynx nonerythematous, no tonsillar exudates.      No cervical lymphadenopathy.  Cardio: Regular rate and rhythm.  No murmur, rub or gallop.  Pulmonary: Lungs clear to auscultation in all fields.  No accessory muscle use.  GI/: Normal active bowel sounds.  Soft, nontender.  No masses or organomegaly appreciated.  Musculoskeletal: No gross deformities appreciated.  Neuro: Alert, age-appropriate.  Normal muscle tone.  Moving all extremities.  Skin: No rash, bruises or lesions.   Assessment/Plan   Problem List Items Addressed This Visit             ICD-10-CM    BPH (benign prostatic hyperplasia) N40.0     Bph is stable      symptoms " controlled with meds v         GERD (gastroesophageal reflux disease) K21.9      The reflux esophagitis he has been occurring in a recurrent pattern for years. The course has been without change. The reflux is described as mild  The patient remains compliant on meds.. The patient takes medications in a daily  fashion. Denies dysphagia hoarseness or odynophagia...          Hypertension I10      Patient is also here for follow-up of hypertension.. Symptoms do not include headache confusion visual disturbance dizziness shortness of breath chest pain syncope or palpitations. Recent blood pressure patient has been checking. 120/80By report there is good compliance with treatment. Pertinent medical history includes  hyperlipidemia .  stable and continue meds             Overweight (BMI 25.0-29.9) E66.3    Recurrent kidney stones N20.0     In remission  .avoid pop   ..    Low protein   suggested         Vitamin D deficiency E55.9     Other Visit Diagnoses         Codes    Routine general medical examination at health care facility    -  Primary Z00.00    Hyperglycemia, unspecified     R73.9    Relevant Orders    POCT glycosylated hemoglobin (Hb A1C) manually resulted    Diabetes mellitus screening     Z13.1    Relevant Orders    POCT glycosylated hemoglobin (Hb A1C) manually resulted    Screening for prostate cancer     Z12.5    Relevant Orders    Prostate Specific Antigen, Screen    Need for vaccination     Z23    Relevant Orders    Pneumococcal conjugate vaccine 20-valent IM              MEDICARE SUMMARY   Colorectal cancer: Screening  adults aged 50-75... In light of your last colonoscopy being 2020. Please follow the recommendations of the gastroenterologist.     HIV infection: Screening:  Those adults at risk H 15-65 years   Not indicated b  High blood pressure: Screening  and home monitoring... Adults with history and at risk  Monitor your blood pressure at home and notify if blood pressure consistently over 140  systolic 90 diastolic   Diabetes mellitus (type II (and abnormal blood glucose: Screening-  hgba1c -adults H 40-70 years who are overweight or obese  Falls prevention in community swelling older adults:.. Interventions--- adults 65 or older  Healthful diet and physical activity for C VD disease prevention: Counseling--adults with CVD risk factors  Hepatitis C virus infection: Screening--adults at high risk and adults born between 1945 and 1965  Abdominal aortic aneurysm: Screening  men ages 65-75 years who have ever    smoked1/21  negative   Aspirin use to prevent CVD and CRC:  Preventitive medication --adults aged 50-59 years with a greater than or equal to 10% 10 year CVD risk no aspiurin   Lung cancer: Screening --adults ages 55-80 with a 30 pack year smoking history and currently smoke or have quit within the past 15 years  Statin use for the primary prevention of CVD: Preventative medicine--adults  40-75 years with no history of CVD, one or more CVD risk factors, and a calculated 10 year CVD event risk of 10% or greater  IMMUNIZATION  UPDATES    p  20 given      Contains abnormal data Comprehensive Metabolic Panel  Order: [id number]   Status: Final result       Visible to patient: Yes (seen)       Dx: Recurrent kidney stones    2 Result Notes            Component  Ref Range & Units 7 d ago  (7/12/24) 7 mo ago  (12/15/23) 1 yr ago  (6/16/23) 1 yr ago  (12/9/22) 2 yr ago  (12/22/21) 3 yr ago  (6/30/21) 3 yr ago  (1/2/21)   Glucose  74 - 99 mg/dL 91 91 89 92 90 97 104 High    Sodium  136 - 145 mmol/L 141 142 140 144 142 139 142   Potassium  3.5 - 5.3 mmol/L 4.3 4.0 3.8 3.9 3.8 3.9 3.8   Chloride  98 - 107 mmol/L 107 107 106 107 103 104 105   Bicarbonate  21 - 32 mmol/L 29 29 28 30 33 High  28 29   Anion Gap  10 - 20 mmol/L 9 Low  10 10 11 10 11 12   Urea Nitrogen  6 - 23 mg/dL 15 14 17 15 14 15 13   Creatinine  0.50 - 1.30 mg/dL 0.93 0.99 0.98 1.01 1.13 1.06 0.96   eGFR  >60 mL/min/1.73m*2 87 81 CM        Comment:  Calculations of estimated GFR are performed using the 2021 CKD-EPI Study Refit equation without the race variable for the IDMS-Traceable creatinine methods.  https://jasn.asnjournals.org/content/early/2021/09/22/ASN.0998521242   Calcium  8.6 - 10.3 mg/dL 9.3 9.1 9.3 9.3 9.4 9.2 9.5   Albumin  3.4 - 5.0 g/dL 3.9 3.9 4.0 3.9 4.1 4.1 4.2   Alkaline Phosphatase  33 - 136 U/L 69 61 67 68 77 72 65   Total Protein  6.4 - 8.2 g/dL 6.8 6.8 7.2 7.2 7.3 7.5 7.6   AST  9 - 39 U/L 17 24 21 19 24 22 22   Bilirubin, Total  0.0 - 1.2 mg/dL 0.6 0.7 0.8 0.8 0.5 0.7 0.8   ALT  10 - 52 U/L 15 22 CM 18 CM 14 CM 28 CM 18 CM 17 CM   Comment: Patients treated with Sulfasalazine may generate falsely decreased results for ALT.      CBC and Auto Differential  Order: [id number]   Status: Final result       Visible to patient: Yes (seen)       Dx: Gastroesophageal reflux disease, unsp...    2 Result Notes            Component  Ref Range & Units 7 d ago  (7/12/24) 7 mo ago  (12/15/23) 1 yr ago  (6/16/23) 1 yr ago  (12/9/22) 2 yr ago  (12/22/21) 3 yr ago  (6/30/21) 3 yr ago  (1/2/21)   WBC  4.4 - 11.3 x10*3/uL 5.5 4.9 5.8 R 5.5 R 6.0 R 6.3 R 6.7 R   nRBC  0.0 - 0.0 /100 WBCs 0.0 0.0        RBC  4.50 - 5.90 x10*6/uL 4.77 4.65 4.95 R 4.84 R 5.13 R 4.88 R 5.01 R   Hemoglobin  13.5 - 17.5 g/dL 14.2 14.1 14.6 14.7 15.4 14.6 14.9   Hematocrit  41.0 - 52.0 % 44.0 42.5 45.2 43.9 47.8 45.1 46.6   MCV  80 - 100 fL 92 91 91 91 93 92 93   MCH  26.0 - 34.0 pg 29.8 30.3        MCHC  32.0 - 36.0 g/dL 32.3 33.2 32.3 33.5 32.2 32.4 32.0   RDW  11.5 - 14.5 % 13.7 13.4 13.5 13.2 13.1 13.1 13.3   Platelets  150 - 450 x10*3/uL 221 240 246 R 234 R 258 R 217 R 244 R   Neutrophils %  40.0 - 80.0 % 57.5 54.5 54.1 57.5 59.5 67.7 58.1   Immature Granulocytes %, Automated  0.0 - 0.9 % 0.2 0.2 CM 0.2 CM 0.2 CM 0.2 CM 0.2 CM 0.3 CM   Comment: Immature Granulocyte Count (IG) includes promyelocytes, myelocytes and metamyelocytes but does not include bands. Percent differential  counts (%) should be interpreted in the context of the absolute cell counts (cells/UL).   Lymphocytes %  13.0 - 44.0 % 25.7 28.7 28.6 26.3 22.1 12.9 25.6   Monocytes %  2.0 - 10.0 % 8.6 9.3 8.9 9.3 9.5 14.2 8.7   Eosinophils %  0.0 - 6.0 % 6.9 5.9 7.0 5.6 7.5 4.0 6.2   Basophils %  0.0 - 2.0 % 1.1 1.4 1.2 1.1 1.2 1.0 1.1   Neutrophils Absolute  1.60 - 5.50 x10*3/uL 3.16 2.69 CM 3.15 R 3.17 R 3.59 R 4.24 R 3.87 R   Comment: Percent differential counts (%) should be interpreted in the context of the absolute cell counts (cells/uL).   Immature Granulocytes Absolute, Automated  0.00 - 0.50 x10*3/uL 0.01 0.01        Lymphocytes Absolute  0.80 - 3.00 x10*3/uL 1.41 1.42 1.67 R 1.45 R 1.33 R 0.81 Low  R 1.70 R   Monocytes Absolute  0.05 - 0.80 x10*3/uL 0.47 0.46 0.52 R 0.51 R 0.57 R 0.89 R 0.58 R   Eosinophils Absolute  0.00 - 0.40 x10*3/uL 0.38 0.29 0.41 High  R 0.31 R 0.45 R 0.25 R 0.41 R   Basophils Absolute  0.00 - 0.10 x10*3/uL 0.06 0.07 0.07 R 0.06 R 0.07 R 0.06 R 0.07 R   Resulting Agency [facility]              Lipid Panel  Order: [id number]   Status: Final result       Visible to patient: Yes (seen)       Dx: Primary hypertension    2 Result Notes       1 HM Topic          Component  Ref Range & Units 7 d ago  (7/12/24) 2 yr ago  (12/22/21) 3 yr ago  (1/2/21) 5 yr ago  (6/14/19) 5 yr ago  (12/11/18)   Cholesterol  0 - 199 mg/dL 145 143    CM   Comment:      Age      Desirable   Borderline High   High     0-19 Y     0 - 169       170 - 199     >/= 200    20-24 Y     0 - 189       190 - 224     >/= 225        >24 Y     0 - 199       200 - 239     >/= 240   **All ranges are based on fasting samples. Specific   therapeutic targets will vary based on patient-specific   cardiac risk.    Pediatric guidelines reference:Pediatrics 2011, 128(S5).Adult guidelines reference: NCEP ATPIII Guidelines,FLIP  2001, 258:2486-97    Venipuncture immediately after or during the administration of Metamizole may lead to falsely low results. Testing should be performed immediately prior to Metamizole dosing.   HDL-Cholesterol  mg/dL 52.2 47.0 CM 53.0 CM 50.4 CM 50.6 CM   Comment:  Age       Very Low   Low     Normal    High    0-19 Y    < 35      < 40     40-45     ----  20-24 Y    ----     < 40      >45      ----        >24 Y      ----     < 40     40-60      >60   Cholesterol/HDL Ratio 2.8 3.0 CM 2.9 CM 2.9 CM 2.8 CM   Comment:  Ref Values  Desirable  < 3.4  High Risk  > 5.0   LDL Calculated  <=99 mg/dL 78 80 R, CM 85 R, CM 79 R, CM 73 R, CM   Comment:                            Near   Borderline      AGE      Desirable  Optimal    High     High     Very High     0-19 Y     0 - 109     ---    110-129   >/= 130     ----    20-24 Y     0 - 119     ---    120-159   >/= 160     ----      >24 Y     0 -  99   100-129  130-159   160-189     >/=190   VLDL  0 - 40 mg/dL 14 16 15 14 19   Triglycerides  0 - 149 mg/dL 72 78 CM 77 CM 72 CM 97 CM   Comment:   Age         Desirable   Borderline High   High     Very High   0 D-90 D    19 - 174         ----         ----        ----  91 D- 9 Y     0 -  74        75 -  99     >/= 100      ----    10-19 Y     0 -  89        90 - 129     >/= 130      ----    20-24 Y     0 - 114       115 - 149     >/= 150      ----        >24 Y     0 - 149       150 - 199    200- 499    >/= 500    Venipuncture immediately after or during the administration of Metamizole may lead to falsely low results. Testing should be performed immediately prior to Metamizole dosing.   Non HDL Cholesterol  0 - 149 mg/dL 93       Comment:      Age       Desirable   Borderline High   High     Very High     0-19 Y     0 - 119       120 - 144     >/= 145    >/= 160    20-24 Y     0 - 149       150 - 189     >/= 190      ----        >24 Y    30 mg/dL above LDL Cholesterol goal   Resulting Agency [facility]              Specimen Collected: 07/12/24 09:13 Last Resulted: 07/12/24 15:53        Lab Flowsheet        Order Details        View Encounter        Lab and Collection Details        Routing        Result History     View All Conversations on this Encounter        CM=Additional comments  R=Reference range differs from dis

## 2024-07-19 NOTE — ASSESSMENT & PLAN NOTE
Patient is also here for follow-up of hypertension.. Symptoms do not include headache confusion visual disturbance dizziness shortness of breath chest pain syncope or palpitations. Recent blood pressure patient has been checking. 120/80By report there is good compliance with treatment. Pertinent medical history includes  hyperlipidemia .  stable and continue meds

## 2024-08-20 DIAGNOSIS — I10 PRIMARY HYPERTENSION: ICD-10-CM

## 2024-08-20 RX ORDER — LOSARTAN POTASSIUM AND HYDROCHLOROTHIAZIDE 12.5; 1 MG/1; MG/1
1 TABLET ORAL DAILY
Qty: 90 TABLET | Refills: 2 | Status: SHIPPED | OUTPATIENT
Start: 2024-08-20

## 2025-01-21 ENCOUNTER — APPOINTMENT (OUTPATIENT)
Dept: PRIMARY CARE | Facility: CLINIC | Age: 75
End: 2025-01-21
Payer: MEDICARE

## 2025-01-24 ENCOUNTER — LAB (OUTPATIENT)
Dept: LAB | Facility: LAB | Age: 75
End: 2025-01-24
Payer: MEDICARE

## 2025-01-24 DIAGNOSIS — Z12.5 SCREENING FOR PROSTATE CANCER: ICD-10-CM

## 2025-01-24 LAB — PSA SERPL-MCNC: 2.4 NG/ML

## 2025-01-24 PROCEDURE — G0103 PSA SCREENING: HCPCS

## 2025-01-31 ENCOUNTER — APPOINTMENT (OUTPATIENT)
Dept: PRIMARY CARE | Facility: CLINIC | Age: 75
End: 2025-01-31
Payer: MEDICARE

## 2025-01-31 VITALS
HEART RATE: 81 BPM | WEIGHT: 189 LBS | OXYGEN SATURATION: 96 % | TEMPERATURE: 97 F | DIASTOLIC BLOOD PRESSURE: 80 MMHG | BODY MASS INDEX: 30.37 KG/M2 | HEIGHT: 66 IN | RESPIRATION RATE: 18 BRPM | SYSTOLIC BLOOD PRESSURE: 130 MMHG

## 2025-01-31 DIAGNOSIS — C06.9 MALIGNANT NEOPLASM OF MOUTH, UNSPECIFIED: ICD-10-CM

## 2025-01-31 DIAGNOSIS — L21.9 SEBORRHEA: Primary | ICD-10-CM

## 2025-01-31 DIAGNOSIS — K21.9 GASTROESOPHAGEAL REFLUX DISEASE, UNSPECIFIED WHETHER ESOPHAGITIS PRESENT: ICD-10-CM

## 2025-01-31 DIAGNOSIS — N40.0 BENIGN PROSTATIC HYPERPLASIA, UNSPECIFIED WHETHER LOWER URINARY TRACT SYMPTOMS PRESENT: ICD-10-CM

## 2025-01-31 DIAGNOSIS — I10 PRIMARY HYPERTENSION: ICD-10-CM

## 2025-01-31 DIAGNOSIS — N20.0 RECURRENT KIDNEY STONES: ICD-10-CM

## 2025-01-31 PROBLEM — Z98.890 PONV (POSTOPERATIVE NAUSEA AND VOMITING): Status: ACTIVE | Noted: 2024-08-14

## 2025-01-31 PROBLEM — R11.2 PONV (POSTOPERATIVE NAUSEA AND VOMITING): Status: ACTIVE | Noted: 2024-08-14

## 2025-01-31 PROCEDURE — 1123F ACP DISCUSS/DSCN MKR DOCD: CPT | Performed by: FAMILY MEDICINE

## 2025-01-31 PROCEDURE — 3079F DIAST BP 80-89 MM HG: CPT | Performed by: FAMILY MEDICINE

## 2025-01-31 PROCEDURE — 1036F TOBACCO NON-USER: CPT | Performed by: FAMILY MEDICINE

## 2025-01-31 PROCEDURE — 99214 OFFICE O/P EST MOD 30 MIN: CPT | Performed by: FAMILY MEDICINE

## 2025-01-31 PROCEDURE — 3075F SYST BP GE 130 - 139MM HG: CPT | Performed by: FAMILY MEDICINE

## 2025-01-31 PROCEDURE — 3008F BODY MASS INDEX DOCD: CPT | Performed by: FAMILY MEDICINE

## 2025-01-31 PROCEDURE — 1159F MED LIST DOCD IN RCRD: CPT | Performed by: FAMILY MEDICINE

## 2025-01-31 RX ORDER — TAMSULOSIN HYDROCHLORIDE 0.4 MG/1
0.4 CAPSULE ORAL DAILY
Qty: 90 CAPSULE | Refills: 3 | Status: SHIPPED | OUTPATIENT
Start: 2025-01-31 | End: 2026-01-31

## 2025-01-31 RX ORDER — KETOCONAZOLE 20 MG/G
1 CREAM TOPICAL DAILY
Qty: 30 G | Refills: 0 | Status: SHIPPED | OUTPATIENT
Start: 2025-01-31

## 2025-01-31 RX ORDER — LANSOPRAZOLE 30 MG/1
30 CAPSULE, DELAYED RELEASE ORAL DAILY
Qty: 90 CAPSULE | Refills: 3 | Status: SHIPPED | OUTPATIENT
Start: 2025-01-31

## 2025-01-31 RX ORDER — POTASSIUM CITRATE 10 MEQ/1
10 TABLET, EXTENDED RELEASE ORAL DAILY
Qty: 90 TABLET | Refills: 3 | Status: SHIPPED | OUTPATIENT
Start: 2025-01-31

## 2025-01-31 RX ORDER — LOSARTAN POTASSIUM AND HYDROCHLOROTHIAZIDE 12.5; 1 MG/1; MG/1
1 TABLET ORAL DAILY
Qty: 90 TABLET | Refills: 3 | Status: SHIPPED | OUTPATIENT
Start: 2025-01-31

## 2025-01-31 RX ORDER — TRIAMCINOLONE ACETONIDE 1 MG/G
1 CREAM TOPICAL 2 TIMES DAILY PRN
Qty: 80 G | Refills: 0 | Status: SHIPPED | OUTPATIENT
Start: 2025-01-31 | End: 2025-02-14

## 2025-01-31 ASSESSMENT — PATIENT HEALTH QUESTIONNAIRE - PHQ9
1. LITTLE INTEREST OR PLEASURE IN DOING THINGS: NOT AT ALL
SUM OF ALL RESPONSES TO PHQ9 QUESTIONS 1 AND 2: 0
2. FEELING DOWN, DEPRESSED OR HOPELESS: NOT AT ALL

## 2025-01-31 NOTE — ASSESSMENT & PLAN NOTE
Patient is also here for follow-up of hypertension.. Symptoms do not include headache confusion visual disturbance dizziness shortness of breath chest pain syncope or palpitations. Recent blood pressure patient has   been checking.  120/85  By report there is good compliance with treatment. Pertinent medical history includes bph..   Bph  nocturia  x 1-2  x  nightly       stable and continue meds

## 2025-01-31 NOTE — ASSESSMENT & PLAN NOTE
The reflux esophagitis he has been occurring in a recurrent pattern for years. The course has been without change. The reflux is described as  moderate. The patient remains compliant on meds.. The patient takes medications in a  dsaily   fashion. Denies dysphagia hoarseness or odynophagia...    stable and continue meds

## 2025-01-31 NOTE — PATIENT INSTRUCTIONS

## 2025-03-07 DIAGNOSIS — N40.0 BENIGN PROSTATIC HYPERPLASIA, UNSPECIFIED WHETHER LOWER URINARY TRACT SYMPTOMS PRESENT: ICD-10-CM

## 2025-03-08 RX ORDER — TAMSULOSIN HYDROCHLORIDE 0.4 MG/1
0.4 CAPSULE ORAL DAILY
Qty: 90 CAPSULE | Refills: 3 | Status: SHIPPED | OUTPATIENT
Start: 2025-03-08

## 2025-07-26 LAB
ALBUMIN SERPL-MCNC: 4.3 G/DL (ref 3.6–5.1)
ALP SERPL-CCNC: 68 U/L (ref 35–144)
ALT SERPL-CCNC: 18 U/L (ref 9–46)
ANION GAP SERPL CALCULATED.4IONS-SCNC: 8 MMOL/L (CALC) (ref 7–17)
AST SERPL-CCNC: 19 U/L (ref 10–35)
BILIRUB SERPL-MCNC: 0.7 MG/DL (ref 0.2–1.2)
BUN SERPL-MCNC: 14 MG/DL (ref 7–25)
CALCIUM SERPL-MCNC: 9.1 MG/DL (ref 8.6–10.3)
CHLORIDE SERPL-SCNC: 104 MMOL/L (ref 98–110)
CO2 SERPL-SCNC: 29 MMOL/L (ref 20–32)
CREAT SERPL-MCNC: 0.98 MG/DL (ref 0.7–1.28)
EGFRCR SERPLBLD CKD-EPI 2021: 81 ML/MIN/1.73M2
GLUCOSE SERPL-MCNC: 101 MG/DL (ref 65–99)
MAGNESIUM SERPL-MCNC: 2.2 MG/DL (ref 1.5–2.5)
POTASSIUM SERPL-SCNC: 4 MMOL/L (ref 3.5–5.3)
PROT SERPL-MCNC: 7.1 G/DL (ref 6.1–8.1)
SODIUM SERPL-SCNC: 141 MMOL/L (ref 135–146)
VIT B12 SERPL-MCNC: 899 PG/ML (ref 200–1100)

## 2025-08-04 ENCOUNTER — APPOINTMENT (OUTPATIENT)
Dept: PRIMARY CARE | Facility: CLINIC | Age: 75
End: 2025-08-04
Payer: MEDICARE

## 2025-08-04 VITALS
TEMPERATURE: 97.2 F | BODY MASS INDEX: 30.05 KG/M2 | SYSTOLIC BLOOD PRESSURE: 128 MMHG | HEART RATE: 76 BPM | WEIGHT: 187 LBS | RESPIRATION RATE: 20 BRPM | HEIGHT: 66 IN | DIASTOLIC BLOOD PRESSURE: 88 MMHG

## 2025-08-04 DIAGNOSIS — Z12.5 SCREENING FOR PROSTATE CANCER: ICD-10-CM

## 2025-08-04 DIAGNOSIS — K21.9 GASTROESOPHAGEAL REFLUX DISEASE, UNSPECIFIED WHETHER ESOPHAGITIS PRESENT: ICD-10-CM

## 2025-08-04 DIAGNOSIS — Z13.220 SCREENING FOR HYPERLIPIDEMIA: ICD-10-CM

## 2025-08-04 DIAGNOSIS — N20.0 RECURRENT KIDNEY STONES: ICD-10-CM

## 2025-08-04 DIAGNOSIS — N40.0 BENIGN PROSTATIC HYPERPLASIA, UNSPECIFIED WHETHER LOWER URINARY TRACT SYMPTOMS PRESENT: ICD-10-CM

## 2025-08-04 DIAGNOSIS — I10 PRIMARY HYPERTENSION: ICD-10-CM

## 2025-08-04 DIAGNOSIS — Z00.00 ROUTINE GENERAL MEDICAL EXAMINATION AT HEALTH CARE FACILITY: Primary | ICD-10-CM

## 2025-08-04 DIAGNOSIS — L21.9 SEBORRHEA: ICD-10-CM

## 2025-08-04 PROCEDURE — 1159F MED LIST DOCD IN RCRD: CPT | Performed by: FAMILY MEDICINE

## 2025-08-04 PROCEDURE — 99214 OFFICE O/P EST MOD 30 MIN: CPT | Performed by: FAMILY MEDICINE

## 2025-08-04 PROCEDURE — 3074F SYST BP LT 130 MM HG: CPT | Performed by: FAMILY MEDICINE

## 2025-08-04 PROCEDURE — 3079F DIAST BP 80-89 MM HG: CPT | Performed by: FAMILY MEDICINE

## 2025-08-04 PROCEDURE — 1158F ADVNC CARE PLAN TLK DOCD: CPT | Performed by: FAMILY MEDICINE

## 2025-08-04 PROCEDURE — 3008F BODY MASS INDEX DOCD: CPT | Performed by: FAMILY MEDICINE

## 2025-08-04 PROCEDURE — 1160F RVW MEDS BY RX/DR IN RCRD: CPT | Performed by: FAMILY MEDICINE

## 2025-08-04 PROCEDURE — 1170F FXNL STATUS ASSESSED: CPT | Performed by: FAMILY MEDICINE

## 2025-08-04 PROCEDURE — G0439 PPPS, SUBSEQ VISIT: HCPCS | Performed by: FAMILY MEDICINE

## 2025-08-04 PROCEDURE — 1036F TOBACCO NON-USER: CPT | Performed by: FAMILY MEDICINE

## 2025-08-04 RX ORDER — POTASSIUM CITRATE 1080 MG/1
10 TABLET, EXTENDED RELEASE ORAL DAILY
Qty: 90 TABLET | Refills: 3 | Status: SHIPPED | OUTPATIENT
Start: 2025-08-04

## 2025-08-04 RX ORDER — TAMSULOSIN HYDROCHLORIDE 0.4 MG/1
0.4 CAPSULE ORAL DAILY
Qty: 90 CAPSULE | Refills: 3 | Status: SHIPPED | OUTPATIENT
Start: 2025-08-04

## 2025-08-04 RX ORDER — LOSARTAN POTASSIUM AND HYDROCHLOROTHIAZIDE 12.5; 1 MG/1; MG/1
1 TABLET ORAL DAILY
Qty: 90 TABLET | Refills: 3 | Status: SHIPPED | OUTPATIENT
Start: 2025-08-04

## 2025-08-04 RX ORDER — LANSOPRAZOLE 30 MG/1
30 CAPSULE, DELAYED RELEASE ORAL DAILY
Qty: 90 CAPSULE | Refills: 3 | Status: SHIPPED | OUTPATIENT
Start: 2025-08-04

## 2025-08-04 ASSESSMENT — ACTIVITIES OF DAILY LIVING (ADL)
DOING_HOUSEWORK: INDEPENDENT
TAKING_MEDICATION: INDEPENDENT
BATHING: INDEPENDENT
GROCERY_SHOPPING: INDEPENDENT
MANAGING_FINANCES: INDEPENDENT
DRESSING: INDEPENDENT

## 2025-08-04 ASSESSMENT — ENCOUNTER SYMPTOMS
LOSS OF SENSATION IN FEET: 0
OCCASIONAL FEELINGS OF UNSTEADINESS: 0
DEPRESSION: 0

## 2025-08-04 NOTE — PROGRESS NOTES
Adan Walters is a 75 y.o. male who is here for a routine exam/Medicare Annual    Active Problem List      Comprehensive Medical/Surgical/Social/Family History  Medical History[1]  Surgical History[2]  Social History     Social History Narrative    Not on file     Social History     Socioeconomic History    Marital status:      Spouse name: Not on file    Number of children: Not on file    Years of education: Not on file    Highest education level: Not on file   Occupational History    Not on file   Tobacco Use    Smoking status: Never    Smokeless tobacco: Never   Vaping Use    Vaping status: Never Used   Substance and Sexual Activity    Alcohol use: Never    Drug use: Never    Sexual activity: Yes     Partners: Female     Birth control/protection: Male Sterilization   Other Topics Concern    Not on file   Social History Narrative    Not on file     Social Drivers of Health     Financial Resource Strain: Not on file   Food Insecurity: Not on file   Transportation Needs: Not on file   Physical Activity: Not on file   Stress: Not on file   Social Connections: Not on file   Intimate Partner Violence: Not on file   Housing Stability: Not on file      Family History[3]   Allergies and Medications  Patient has no known allergies.  Medications Ordered Prior to Encounter[4]    Concerns today:  Needs refills    Patient is also here for follow-up of hypertension.. Symptoms do not include headache confusion visual disturbance dizziness shortness of breath chest pain syncope or palpitations. Recent blood pressure patient has  been checking....120s//80s By report there is good compliance with treatment. Pertinent medical history includes bph.  Bph stable continues  tamsolosin    .. No stones.  /   The reflux esophagitis he has been occurring in a recurrent pattern for years. The course has been without change. The reflux is described as severe.. The patient remains compliant on meds.. The patient takes  "medications in a   daily fashion. Denies dysphagia hoarseness or odynophagia...   Seborrhea and  med helped   last visit   Lifestyle  Diet: Could be improved  Physical Activity: Not on file      Tobacco Use: Low Risk  (8/4/2025)    Patient History     Smoking Tobacco Use: Never     Smokeless Tobacco Use: Never     Passive Exposure: Not on file      Alcohol Use: Not on file      Depression: Not at risk (8/4/2025)    PHQ-2     PHQ-2 Score: 0      Stress: Not on file         Consultants:  Patient Care Team:  Juan Izaguirre DO as PCP - General  Juan Izaguirre [facility] as PCP [facility] Attributed Provider     Colorectal Screening:   colonoscopy  Date: 8/3/2020 repeat 10 years    Nocturia: .Present  Erectile dysfunction: .Did not discuss    Review of Systems  All other  pertinent  systems reviewed and are negative except  those  mentioned  in HPI   Objective   /88   Pulse 76   Temp 36.2 °C (97.2 °F)   Resp 20   Ht 1.676 m (5' 6\")   Wt 84.8 kg (187 lb)   BMI 30.18 kg/m²     Physical Exam  Vitals: I have reviewed the vitals  General: Well-developed.  In no acute distress.  Eyes:   sclera nonicteric.  Conjunctiva not injected.  No discharge.   HEAD: Normocephalic, atraumatic.  HEENT   Mucous membranes moist.  Posterior oropharynx nonerythematous, no tonsillar exudates.     TONGUE  //S/P  PARTIAL RESECTION  No cervical lymphadenopathy.  Cardio: Regular rate and rhythm.  No murmur, rub or gallop.  Pulmonary: Lungs clear to auscultation in all fields.  No accessory muscle use.  GI/: Normal active bowel sounds.  Soft, nontender.  No masses or organomegaly appreciated.  Musculoskeletal: No gross deformities appreciated.  Neuro: Alert, age-appropriate.  Normal muscle tone.  Moving all extremities.  Skin: No rash, bruises or lesions.   Assessment/Plan   Problem List Items Addressed This Visit       BPH (benign prostatic hyperplasia)    Bph stable continues  tamsolosin    .. No stones.   stable and continue meds       "    Relevant Medications    tamsulosin (Flomax) 0.4 mg 24 hr capsule    GERD (gastroesophageal reflux disease)       The reflux esophagitis he has been occurring in a recurrent pattern for years. The course has been without change. The reflux is described as severe.. The patient remains compliant on meds.. The patient takes medications in a   daily fashion. Denies dysphagia hoarseness or odynophagia...   stable and continue meds             Relevant Medications    lansoprazole (Prevacid) 30 mg DR capsule    Hypertension     Patient is also here for follow-up of hypertension.. Symptoms do not include headache confusion visual disturbance dizziness shortness of breath chest pain syncope or palpitations. Recent blood pressure patient has  been checking....120s//80s By report there is good compliance with treatment. Pertinent medical history includes bph.  stable and continue meds             Relevant Medications    losartan-hydrochlorothiazide (Hyzaar) 100-12.5 mg tablet    potassium citrate CR (Urocit-K-10) 10 mEq ER tablet    tamsulosin (Flomax) 0.4 mg 24 hr capsule    Recurrent kidney stones    Relevant Medications    potassium citrate CR (Urocit-K-10) 10 mEq ER tablet    Seborrhea    Resolved call if recurs          Other Visit Diagnoses         Routine general medical examination at health care facility    -  Primary    Relevant Orders    1 Year Follow Up In [facility] - PCP - Wellness Exam    Comprehensive Metabolic Panel      Screening for hyperlipidemia        Relevant Orders    Lipid Panel      Screening for prostate cancer        Relevant Orders    PSA screen          Reviewed Social Determinants of health with patient, discussed healthy lifestyle including 150 minutes of physical activity per week  Ordered/Reviewed baseline labwork -CBC, CMP, Lipid Panel  Immunizations: Recommended RSV shot and Recommended Shingrix series   Colonoscopy REPEAT   2030 ...   DIVERTICULOSIS  ?? EGD     Health  Counseling            [1]   Past Medical History:  Diagnosis Date    Cancer (Multi) 08/2005    GERD (gastroesophageal reflux disease) 1995    Hypertension 1998   [2]   Past Surgical History:  Procedure Laterality Date    CIRCUMCISION, PRIMARY  1951    MOUTH SURGERY      VASECTOMY  1995   [3]   Family History  Problem Relation Name Age of Onset    Asthma Mother Sho     Tuberculosis Mother Sho     Heart disease Mother Sho     Breast cancer Mother Sho     Arthritis Mother Sho     Cancer Mother Sho     Stroke Mother Sho     Other (brain tumor) Father Tiburcio     Cancer Father Tiburcio     Hypertension Mother Sho 50 - 59   [4]   Current Outpatient Medications on File Prior to Visit   Medication Sig Dispense Refill    ketoconazole (NIZOral) 2 % cream Apply 1 Application topically once daily. 30 g 0    [DISCONTINUED] lansoprazole (Prevacid) 30 mg DR capsule Take 1 capsule (30 mg) by mouth once daily. 90 capsule 3    [DISCONTINUED] losartan-hydrochlorothiazide (Hyzaar) 100-12.5 mg tablet Take 1 tablet by mouth once daily. 90 tablet 3    [DISCONTINUED] potassium citrate CR (Urocit-K-10) 10 mEq ER tablet Take 1 tablet (10 mEq) by mouth once daily. 90 tablet 3    [DISCONTINUED] tamsulosin (Flomax) 0.4 mg 24 hr capsule TAKE 1 CAPSULE BY MOUTH DAILY 90 capsule 3     No current facility-administered medications on file prior to visit.

## 2025-08-04 NOTE — PATIENT INSTRUCTIONS
Discussed medication side effects.  The  risk benefits and treatment options  discussed with patient.       Please schedule follow-up appointment based upon your improvement/failure to improve/chronic medical conditions and physician recommendations during office appointment at the .       Patient advised to go to er if symptoms worsen or to call answering service, or to return to office for additional evaluation    This note was partially  generated using Dragon voice recognition and there may be incorrect words, wording, spelling, or pronunciation errors that were not corrected prior to committing the note to the medical record.

## 2025-08-04 NOTE — ASSESSMENT & PLAN NOTE
The reflux esophagitis he has been occurring in a recurrent pattern for years. The course has been without change. The reflux is described as severe.. The patient remains compliant on meds.. The patient takes medications in a   daily fashion. Denies dysphagia hoarseness or odynophagia...   stable and continue meds

## 2025-08-04 NOTE — ASSESSMENT & PLAN NOTE
Patient is also here for follow-up of hypertension.. Symptoms do not include headache confusion visual disturbance dizziness shortness of breath chest pain syncope or palpitations. Recent blood pressure patient has  been checking....120s//80s By report there is good compliance with treatment. Pertinent medical history includes bph.  stable and continue meds

## 2026-02-06 ENCOUNTER — APPOINTMENT (OUTPATIENT)
Dept: PRIMARY CARE | Facility: CLINIC | Age: 76
End: 2026-02-06
Payer: MEDICARE